# Patient Record
Sex: FEMALE | Race: WHITE | NOT HISPANIC OR LATINO | Employment: OTHER | URBAN - METROPOLITAN AREA
[De-identification: names, ages, dates, MRNs, and addresses within clinical notes are randomized per-mention and may not be internally consistent; named-entity substitution may affect disease eponyms.]

---

## 2017-02-13 NOTE — PATIENT DISCUSSION
NONPROLIFERATIVE DIABETIC RETINOPATHY OS: STABLE -  RETURN FOR FOLLOW-UP AS SCHEDULED FOR DILATED EYE EXAM.

## 2017-08-14 NOTE — PATIENT DISCUSSION
NONPROLIFERATIVE DIABETIC RETINOPATHY OS: STABLE RETINOPATHY -  RETURN FOR FOLLOW-UP AS SCHEDULED FOR DILATED EYE EXAM.

## 2017-08-14 NOTE — PATIENT DISCUSSION
PROLIFERATIVE DIABETIC RETINOPATHY OD: NO TREATMENT IS NEEDED TODAY. WILL CONTINUE TO FOLLOW WITH DILATED EXAMS AS SCHEDULED.

## 2018-06-28 NOTE — PATIENT DISCUSSION
BLEPHARITIS, OU: PRESCRIBE WARM COMPRESSES AND EYELID SCRUBS QD-BID, ARTIFICIAL TEARS BID-QID, AND ERYTHROMYCIN OPHTHALMIC OINTMENT EVERY NIGHT AS NEEDED. RETURN FOR FOLLOW-UP AS SCHEDULED.  ESCRIBED E-FRANCOISEIN TODAY

## 2018-06-28 NOTE — PATIENT DISCUSSION
POSTERIOR CAPSULAR FIBROSIS, OS:  VISUALLY SIGNIFICANT. OPTION OF YAG LASER VERSUS UPDATING GLASSES VERSUS FOLLOWING DISCUSSED. RBA'S DISCUSSED, PATIENT UNDERSTANDS AND DESIRES YAG LASER TO INCREASE VISION FOR DRIVING, READING AND WATCHING TV .  SCHEDULE YAG LASER OS.

## 2018-07-11 NOTE — PATIENT DISCUSSION
Continue: erythromycin (erythromycin): ointment: 5 mg/gram (0.5 %) 1 a small amount at bedtime into both eyes 06-

## 2019-05-01 NOTE — PATIENT DISCUSSION
Continue: Refresh Classic (PF) (polyvinyl alcohol-povidon(pf)): dropperette: 1.4-0.6% Patient here for PPD read. Negative 0mm

## 2019-06-20 NOTE — PATIENT DISCUSSION
PROLIFERATIVE DIABETIC RETINOPATHY OD: WILL CONTINUE TO FOLLOW WITH DILATED EXAMS AS SCHEDULED. CONTINUE FOLLOW UP CARE WITH DR. FRANCSI.

## 2019-06-24 NOTE — PATIENT DISCUSSION
Stopped Today: erythromycin (erythromycin): ointment: 5 mg/gram (0.5 %) 1 a small amount at bedtime into both eyes 06-

## 2020-03-18 ENCOUNTER — ESTABLISHED COMPREHENSIVE EXAM (OUTPATIENT)
Dept: URBAN - METROPOLITAN AREA CLINIC 42 | Facility: CLINIC | Age: 65
End: 2020-03-18

## 2020-03-18 VITALS — SYSTOLIC BLOOD PRESSURE: 125 MMHG | DIASTOLIC BLOOD PRESSURE: 80 MMHG | HEIGHT: 55 IN

## 2020-03-18 DIAGNOSIS — H52.203: ICD-10-CM

## 2020-03-18 DIAGNOSIS — H52.4: ICD-10-CM

## 2020-03-18 DIAGNOSIS — H52.03: ICD-10-CM

## 2020-03-18 PROCEDURE — 92310-2 LEVEL 2 CONTACT LENS MANAGEMENT

## 2020-03-18 PROCEDURE — 92014 COMPRE OPH EXAM EST PT 1/>: CPT

## 2020-03-18 PROCEDURE — 92015 DETERMINE REFRACTIVE STATE: CPT

## 2020-03-18 ASSESSMENT — VISUAL ACUITY
OS_SC: 20/100
OU_SC: 20/200
OU_SC: 20/60-2
OD_SC: 20/100
OS_SC: 20/200
OD_SC: 20/200

## 2020-03-18 ASSESSMENT — TONOMETRY
OD_IOP_MMHG: 11
OS_IOP_MMHG: 8

## 2020-03-18 ASSESSMENT — KERATOMETRY
OS_AXISANGLE_DEGREES: 169
OD_K2POWER_DIOPTERS: 44
OS_K1POWER_DIOPTERS: 43
OD_AXISANGLE_DEGREES: 14
OD_AXISANGLE2_DEGREES: 104
OS_K2POWER_DIOPTERS: 44.25
OD_K1POWER_DIOPTERS: 43.5
OS_AXISANGLE2_DEGREES: 79

## 2020-07-07 NOTE — PATIENT DISCUSSION
PROLIFERATIVE DIABETIC RETINOPATHY OD - DIABETIC RETINOPATHY OS: STABLE RETINOPATHY - RETURN FOR FOLLOW-UP AS SCHEDULED FOR DILATED EYE EXAM.  CONTINUE FOLLOW UP CARE WITH DR. FRANCIS.

## 2021-03-03 NOTE — PATIENT DISCUSSION
UNABLE TO GET A SCAN OD AND HAZY VIEW OS DUE TO CORNEAL DRYNESS. WILL REFER TO DR GUAJARDO [facility] FOR TREATMENT.

## 2021-03-19 NOTE — PATIENT DISCUSSION
Plugs instilled all puntums today. Collagen used, patient informed will last about 30 days. Rechecked va after increased blink and instillation of plugs for 5 mintures. VA binocular 20/100 -2.

## 2021-03-19 NOTE — PATIENT DISCUSSION
OD&gt;OS. Blepharitis OU. Start Erythromycin don one to two times a day to lash line. After a few days start using ointment in the eyes QHS OU.

## 2021-03-29 ENCOUNTER — ESTABLISHED COMPREHENSIVE EXAM (OUTPATIENT)
Dept: URBAN - METROPOLITAN AREA CLINIC 42 | Facility: CLINIC | Age: 66
End: 2021-03-29

## 2021-03-29 DIAGNOSIS — Z01.00: ICD-10-CM

## 2021-03-29 DIAGNOSIS — H52.03: ICD-10-CM

## 2021-03-29 DIAGNOSIS — H52.4: ICD-10-CM

## 2021-03-29 DIAGNOSIS — H52.223: ICD-10-CM

## 2021-03-29 PROCEDURE — 92014 COMPRE OPH EXAM EST PT 1/>: CPT

## 2021-03-29 PROCEDURE — 92015 DETERMINE REFRACTIVE STATE: CPT

## 2021-03-29 ASSESSMENT — KERATOMETRY
OD_K2POWER_DIOPTERS: 44
OS_AXISANGLE2_DEGREES: 79
OS_K2POWER_DIOPTERS: 45.25
OD_K1POWER_DIOPTERS: 43.5
OS_K1POWER_DIOPTERS: 43.25
OS_K2POWER_DIOPTERS: 44.25
OD_AXISANGLE2_DEGREES: 104
OS_AXISANGLE_DEGREES: 169
OD_AXISANGLE_DEGREES: 018
OD_K1POWER_DIOPTERS: 43.75
OD_K2POWER_DIOPTERS: 44.50
OS_K1POWER_DIOPTERS: 43
OD_AXISANGLE_DEGREES: 14
OD_AXISANGLE2_DEGREES: 108

## 2021-03-29 ASSESSMENT — VISUAL ACUITY
OD_SC: 20/200
OS_SC: 20/200
OS_SC: 20/100
OU_SC: 20/40
OU_SC: 20/200
OD_SC: 20/50

## 2021-03-29 ASSESSMENT — TONOMETRY
OS_IOP_MMHG: 10
OD_IOP_MMHG: 9

## 2021-04-20 NOTE — PATIENT DISCUSSION
OD&gt;OS. Blepharitis OU. Continue Erythromycin don one to two times a day to lash line. After a few days start using ointment in the eyes QHS OU. Collagen plugs placed at last visit in all 4 puncta's and in place.

## 2021-04-20 NOTE — PATIENT DISCUSSION
Continue: erythromycin (erythromycin): ointment: 5 mg/gram (0.5 %) 1 a small amount at bedtime into both eyes 03-

## 2021-05-05 NOTE — PATIENT DISCUSSION
Moderate Dry Eyes:  Recommend use of artificial tears bid - qid 4-6x daily OU add nightly lubricating ointment or gel. Discussed importance of turning off ceiling fan while sleeping and keeping AC vents pointed away from the face while in the car.

## 2021-05-05 NOTE — PATIENT DISCUSSION
Do not recommend any surgical intervention at this time. The patient is a poor candidate due to anesthesia risks.

## 2021-06-01 NOTE — PATIENT DISCUSSION
Dr. Annie Avila did not recommend any surgical intervention at this time. The patient is a poor candidate due to anesthesia risks.

## 2021-06-29 NOTE — PATIENT DISCUSSION
Dr. Sj Reyes did not recommend any surgical intervention at this time. The patient is a poor candidate due to anesthesia risks.

## 2021-07-14 NOTE — PATIENT DISCUSSION
DRY EYE SYNDROME OU: INCREASE ARTIFICIAL TEARS / GEL AS NEEDED TO INCREASE COMFORT OU. IF SYMPTOMS PERSIST CONSIDER PUNCTAL PLUGS.

## 2021-07-14 NOTE — PATIENT DISCUSSION
PROLIFERATIVE DIABETIC RETINOPATHY OD/ NPDR OS: WILL CONTINUE TO FOLLOW WITH DILATED EXAMS AS SCHEDULED. CONTINUE FOLLOW UP CARE WITH DR. FRANCIS.

## 2021-08-17 NOTE — PATIENT DISCUSSION
OD&gt;OS. Blepharitis OU. Continue ointment in the right eye during the day if he tolerates and QHS. Patient having a hard time finding [employer].

## 2021-10-12 NOTE — PATIENT DISCUSSION
Had eval with Dr. Simin Sharif, was not listed as a candidate for tarrshorhapy due to Pradaxa medication.

## 2024-03-22 ENCOUNTER — COMPREHENSIVE EXAM (OUTPATIENT)
Dept: URBAN - METROPOLITAN AREA CLINIC 42 | Facility: CLINIC | Age: 69
End: 2024-03-22

## 2024-03-22 DIAGNOSIS — H52.223: ICD-10-CM

## 2024-03-22 DIAGNOSIS — Z96.1: ICD-10-CM

## 2024-03-22 DIAGNOSIS — Z01.00: ICD-10-CM

## 2024-03-22 DIAGNOSIS — H52.01: ICD-10-CM

## 2024-03-22 DIAGNOSIS — H52.4: ICD-10-CM

## 2024-03-22 PROCEDURE — 92014 COMPRE OPH EXAM EST PT 1/>: CPT

## 2024-03-22 PROCEDURE — 92015 DETERMINE REFRACTIVE STATE: CPT

## 2024-03-22 ASSESSMENT — KERATOMETRY
OD_AXISANGLE2_DEGREES: 104
OS_AXISANGLE_DEGREES: 169
OS_K1POWER_DIOPTERS: 43
OS_K1POWER_DIOPTERS: 43.25
OS_K2POWER_DIOPTERS: 45.25
OS_K2POWER_DIOPTERS: 44.25
OD_AXISANGLE2_DEGREES: 108
OS_AXISANGLE2_DEGREES: 79
OD_K2POWER_DIOPTERS: 44.50
OS_K1POWER_DIOPTERS: 44.25
OD_AXISANGLE_DEGREES: 018
OS_AXISANGLE2_DEGREES: 161
OS_K2POWER_DIOPTERS: 43.25
OD_K1POWER_DIOPTERS: 44.00
OD_K2POWER_DIOPTERS: 42.75
OD_K1POWER_DIOPTERS: 43.75
OS_AXISANGLE_DEGREES: 71
OD_AXISANGLE_DEGREES: 14
OD_AXISANGLE_DEGREES: 105
OD_K1POWER_DIOPTERS: 43.5
OD_K2POWER_DIOPTERS: 44
OD_AXISANGLE2_DEGREES: 15

## 2024-03-22 ASSESSMENT — TONOMETRY
OD_IOP_MMHG: 5
OS_IOP_MMHG: 8

## 2024-03-22 ASSESSMENT — VISUAL ACUITY
OS_SC: 20/30
OS_SC: 20/100
OD_SC: 20/25
OU_SC: 20/25
OD_SC: 20/200
OU_SC: 20/100